# Patient Record
Sex: MALE | Race: WHITE | NOT HISPANIC OR LATINO | URBAN - METROPOLITAN AREA
[De-identification: names, ages, dates, MRNs, and addresses within clinical notes are randomized per-mention and may not be internally consistent; named-entity substitution may affect disease eponyms.]

---

## 2017-09-30 ENCOUNTER — EMERGENCY (EMERGENCY)
Facility: HOSPITAL | Age: 32
LOS: 1 days | Discharge: HOME | End: 2017-09-30
Attending: EMERGENCY MEDICINE | Admitting: EMERGENCY MEDICINE
Payer: COMMERCIAL

## 2017-09-30 VITALS
OXYGEN SATURATION: 96 % | SYSTOLIC BLOOD PRESSURE: 151 MMHG | RESPIRATION RATE: 18 BRPM | DIASTOLIC BLOOD PRESSURE: 81 MMHG | HEART RATE: 82 BPM | TEMPERATURE: 98 F

## 2017-09-30 VITALS — WEIGHT: 190.04 LBS

## 2017-09-30 PROCEDURE — 90715 TDAP VACCINE 7 YRS/> IM: CPT

## 2017-09-30 PROCEDURE — 99284 EMERGENCY DEPT VISIT MOD MDM: CPT

## 2017-09-30 PROCEDURE — 90472 IMMUNIZATION ADMIN EACH ADD: CPT

## 2017-09-30 PROCEDURE — 90375 RABIES IG IM/SC: CPT

## 2017-09-30 PROCEDURE — 90471 IMMUNIZATION ADMIN: CPT

## 2017-09-30 PROCEDURE — 90675 RABIES VACCINE IM: CPT

## 2017-09-30 PROCEDURE — 99283 EMERGENCY DEPT VISIT LOW MDM: CPT | Mod: 25

## 2017-09-30 PROCEDURE — 96372 THER/PROPH/DIAG INJ SC/IM: CPT

## 2017-09-30 RX ORDER — HUMAN RABIES VIRUS IMMUNE GLOBULIN 150 [IU]/ML
1720 INJECTION, SOLUTION INTRAMUSCULAR ONCE
Qty: 0 | Refills: 0 | Status: COMPLETED | OUTPATIENT
Start: 2017-09-30 | End: 2017-09-30

## 2017-09-30 RX ORDER — RABIES VACC, HUMAN DIPLOID/PF 2.5 UNIT
1 VIAL (EA) INTRAMUSCULAR ONCE
Qty: 0 | Refills: 0 | Status: COMPLETED | OUTPATIENT
Start: 2017-09-30 | End: 2017-09-30

## 2017-09-30 RX ORDER — TETANUS TOXOID, REDUCED DIPHTHERIA TOXOID AND ACELLULAR PERTUSSIS VACCINE, ADSORBED 5; 2.5; 8; 8; 2.5 [IU]/.5ML; [IU]/.5ML; UG/.5ML; UG/.5ML; UG/.5ML
0.5 SUSPENSION INTRAMUSCULAR ONCE
Qty: 0 | Refills: 0 | Status: COMPLETED | OUTPATIENT
Start: 2017-09-30 | End: 2017-09-30

## 2017-09-30 RX ADMIN — HUMAN RABIES VIRUS IMMUNE GLOBULIN 1720 UNIT(S): 150 INJECTION, SOLUTION INTRAMUSCULAR at 18:08

## 2017-09-30 RX ADMIN — Medication 1 MILLILITER(S): at 17:49

## 2017-09-30 RX ADMIN — TETANUS TOXOID, REDUCED DIPHTHERIA TOXOID AND ACELLULAR PERTUSSIS VACCINE, ADSORBED 0.5 MILLILITER(S): 5; 2.5; 8; 8; 2.5 SUSPENSION INTRAMUSCULAR at 17:48

## 2017-09-30 NOTE — ED PROVIDER NOTE - OBJECTIVE STATEMENT
Attending MD Bradley: 32M with PMH including asthma presents to the ED with concern over exposure to rabies.  Reports that on 9/27/17 there was a raccoon in his office and it was ushered out during the day.  Reports that same day he was using kitched utensils when his friend reported that the raccoon was licking utensils.  Since then has been concerned about exposure to rabies.  Reports attempted to contact PMD [facility] via email on day of incident and attempted to call on Friday but did not receive response.  Here today for concern of rabies exposure.  Denies fevers, chills, malaise, weakness, decreased appetite, sore throat, nausea, vomiting, headache, photophobia, change in vision. Denies chest pain, shortness of breath, palpitations.    On exam, NAD, head NCAT, PERRL, CN2-12 grossly intact, FROM at neck, no tenderness to palpation or stepoffs along length of spine, lungs CTAB with good inspiratory effort, +S1S2, no m/r/g, abdomen soft with +BS, NT, ND, no CVAT, moving all extremities with 5/5 strength bilateral upper and lower extremities, good and equal  strength bilaterally, sensory grossly intact Attending MD Bradley: 32M with PMH including asthma presents to the ED with concern over exposure to rabies.  Reports that on 9/27/17 there was a raccoon in his office and it was ushered out during the day.  Reports that same day he was using kitched utensils when his friend reported that the raccoon was licking utensils.  Since then has been concerned about exposure to rabies.  Reports attempted to contact PMD [facility] via email on day of incident and attempted to call on Friday but did not receive response.  Here today for concern of rabies exposure.  Denies fevers, chills, malaise, weakness, decreased appetite, sore throat, nausea, vomiting, headache, photophobia, change in vision. Denies chest pain, shortness of breath, palpitations.    On exam, NAD, head NCAT, PERRL, CN2-12 grossly intact, FROM at neck, no tenderness to palpation or stepoffs along length of spine, lungs CTAB with good inspiratory effort, +S1S2, no m/r/g, abdomen soft with +BS, NT, ND, no CVAT, moving all extremities with 5/5 strength bilateral upper and lower extremities, good and equal  strength bilaterally, sensory grossly intact    Mariya CEJA: 32 year-old male w/ no pertinent past medical history presents to the ED for concerns about rabies exposure.  A raccoon was found in the patient's workplace.  He did not have any direct contact with the raccoon - no bites or scratches.  He was eating in the kitchen and did not realize that the raccoon was licking spoons.  He is unsure if he used the same spoon.  Called PCP office (closed due to holidays) and came to ED for evaluation. Attending MD Bradley: 32M with PMH including asthma presents to the ED with concern over exposure to rabies.  Reports that on 9/27/17 there was a raccoon in his office and it was ushered out during the day.  Reports that same day he was using kitchen utensils when his friend reported that the raccoon was licking utensils.  Since then has been concerned about exposure to rabies.  Reports attempted to contact PMD [facility] via email on day of incident and attempted to call on Friday but did not receive response.  Here today for concern of rabies exposure.  Reports raccoon was "kept" by animal control for suspicion of rabies.  Denies fevers, chills, malaise, weakness, decreased appetite, sore throat, nausea, vomiting, headache, photophobia, change in vision. Denies chest pain, shortness of breath, palpitations.    On exam, NAD, head NCAT, PERRL, CN2-12 grossly intact, FROM at neck, no tenderness to palpation or stepoffs along length of spine, lungs CTAB with good inspiratory effort, +S1S2, no m/r/g, abdomen soft with +BS, NT, ND, no CVAT, moving all extremities with 5/5 strength bilateral upper and lower extremities, good and equal  strength bilaterally, sensory grossly intact    Mariya MD: 32 year-old male w/ no pertinent past medical history presents to the ED for concerns about rabies exposure.  A raccoon was found in the patient's workplace.  He did not have any direct contact with the raccoon - no bites or scratches.  He was eating in the kitchen and did not realize that the raccoon was licking spoons.  He is unsure if he used the same spoon.  Called PCP office (closed due to holidays) and came to ED for evaluation.

## 2017-09-30 NOTE — ED PROVIDER NOTE - ATTENDING CONTRIBUTION TO CARE
Attending MD Bradley: I personally have seen and examined this patient.  Resident note reviewed and agree on plan of care and except where noted.  See HPI for details.

## 2017-09-30 NOTE — ED PROVIDER NOTE - MEDICAL DECISION MAKING DETAILS
32M with possible rabies exposure, will give rabies vaccine and post exposure prophylaxis 32M with possible rabies exposure, will give rabies vaccine and post exposure prophylaxis.

## 2017-09-30 NOTE — ED ADULT NURSE NOTE - OBJECTIVE STATEMENT
33 y/o male presents to ED c.o exposure to raccoon saliva on Wednesday. Pt states a raccoon got into his office and was licking utensils. Pt states he used utensils without knowing they were dirty. Pt attempted to contact primary but could not reach him. Pt denies any symptoms currently. Pt safety and comfort provided.

## 2017-09-30 NOTE — ED PROVIDER NOTE - CARE PLAN
Principal Discharge DX:	Rabies exposure Principal Discharge DX:	Rabies exposure  Instructions for follow-up, activity and diet:	Follow-up with your Primary Care Physician within 24-48 hours.  Please return to the Emergency Department immediately for any new, worsening or concerning symptoms; specifically those included in the attached information brochure.     Please come back to the Emergency Room on Tuesday, October 3rd for your next Rabies vaccination.

## 2017-09-30 NOTE — ED PROVIDER NOTE - PLAN OF CARE
Follow-up with your Primary Care Physician within 24-48 hours.  Please return to the Emergency Department immediately for any new, worsening or concerning symptoms; specifically those included in the attached information brochure.     Please come back to the Emergency Room on Tuesday, October 3rd for your next Rabies vaccination.

## 2017-09-30 NOTE — ED PROVIDER NOTE - PHYSICAL EXAMINATION
Attending MD Bradley: See HPI Attending MD Bradley: See HPI    *Gen: NAD, AAO*3, well-appearing, well-nourished  *HEENT: NC/AT, MMM, airway patent, trachea midline  *CV: RRR, S1/S2 present, no murmurs/rubs/gallops  *Resp: no respiratory distress, LCTAB, no wheezing/rales/rhonchi  *Abd: non-distended, soft N/Tx4, no guarding or rigidity  *Neuro: no focal neuro deficits, moving all limbs appropriately  *Extremities: no gross deformity, PMS*4  *Skin: no rashes, no wounds   ~ Gilmar Meraz M.D.

## 2017-09-30 NOTE — ED ADULT TRIAGE NOTE - CCCP TRG CHIEF CMPLNT
exposed to rabid animal/exposure, bloodborne pathogen exposed to rabid animal/see chief complaint quote

## 2017-10-03 ENCOUNTER — EMERGENCY (EMERGENCY)
Facility: HOSPITAL | Age: 32
LOS: 1 days | Discharge: HOME | End: 2017-10-03
Attending: PERSONAL EMERGENCY RESPONSE ATTENDANT | Admitting: PERSONAL EMERGENCY RESPONSE ATTENDANT
Payer: COMMERCIAL

## 2017-10-03 VITALS
DIASTOLIC BLOOD PRESSURE: 89 MMHG | OXYGEN SATURATION: 98 % | HEART RATE: 78 BPM | WEIGHT: 195.11 LBS | TEMPERATURE: 99 F | SYSTOLIC BLOOD PRESSURE: 128 MMHG | RESPIRATION RATE: 16 BRPM

## 2017-10-03 PROCEDURE — 90675 RABIES VACCINE IM: CPT

## 2017-10-03 PROCEDURE — 99283 EMERGENCY DEPT VISIT LOW MDM: CPT | Mod: 25

## 2017-10-03 PROCEDURE — 99283 EMERGENCY DEPT VISIT LOW MDM: CPT

## 2017-10-03 PROCEDURE — 90471 IMMUNIZATION ADMIN: CPT

## 2017-10-03 RX ORDER — RABIES VACC, HUMAN DIPLOID/PF 2.5 UNIT
1 VIAL (EA) INTRAMUSCULAR ONCE
Qty: 0 | Refills: 0 | Status: COMPLETED | OUTPATIENT
Start: 2017-10-03 | End: 2017-10-03

## 2017-10-03 RX ADMIN — Medication 1 MILLILITER(S): at 11:44

## 2017-10-03 NOTE — ED ADULT NURSE NOTE - CHPI ED SYMPTOMS NEG
no decreased eating/drinking/no vomiting/no dizziness/no chills/no tingling/no weakness/no fever/no pain/no numbness/no nausea

## 2017-10-03 NOTE — ED PROVIDER NOTE - MEDICAL DECISION MAKING DETAILS
32M presenting for day 3 repeat dose of rabies vaccine, pt is well appearing, only complaining of mild fogginess since exposure, no const. symptoms, vitals stable, will provide day 3 of vaccine, instruct pt to return to ED for day 7 repeat vaccine administration, plan to d/c with strict return precautions, PMD follow up. 32M presenting for day 3 repeat dose of rabies vaccine, pt is well appearing, only complaining of mild fogginess since exposure, no const. symptoms, able to tolerate PO, vitals stable, will provide day 3 of vaccine, instruct pt to return to ED for day 7 repeat vaccine administration, plan to d/c with strict return precautions, PMD follow up.

## 2017-10-03 NOTE — ED PROVIDER NOTE - PLAN OF CARE
Thank you for visiting our Emergency Department, it has been a pleasure taking part in your healthcare.    Your discharge diagnosis is: rabies, need for vaccination    Please take all discharge medications as indicated below:  Please return to ED on Oct 7 2017 for repeat rabies vaccination.     Please take all medications as indicated. Please follow up with your PMD within x48 hours.    Return precautions to the Emergency Department include: unrelenting nausea, vomiting, fever, chills, chest pain, shortness of breath, dizziness, abdominal pain, syncope, blood in urine or stool, headache that doesn't resolve, numbness or tingling, loss of sensation, loss of motor function, inability to tolerate fluids or food, neck stiffness, photophobia, or any other concerning symptoms.

## 2017-10-03 NOTE — ED PROVIDER NOTE - CARE PLAN
Principal Discharge DX:	Rabies, need for prophylactic vaccination against  Instructions for follow-up, activity and diet:	Thank you for visiting our Emergency Department, it has been a pleasure taking part in your healthcare.    Your discharge diagnosis is: rabies, need for vaccination    Please take all discharge medications as indicated below:  Please return to ED on Oct 7 2017 for repeat rabies vaccination.     Please take all medications as indicated. Please follow up with your PMD within x48 hours.    Return precautions to the Emergency Department include: unrelenting nausea, vomiting, fever, chills, chest pain, shortness of breath, dizziness, abdominal pain, syncope, blood in urine or stool, headache that doesn't resolve, numbness or tingling, loss of sensation, loss of motor function, inability to tolerate fluids or food, neck stiffness, photophobia, or any other concerning symptoms.

## 2017-10-03 NOTE — ED PROVIDER NOTE - ATTENDING CONTRIBUTION TO CARE
Attending MD Rivero.  Pt is a 32 yr old male without past medical hx.  Presents with follow-up for rabies vaccine begun on Saturday.  Today is day 3 of course.  Exposure was Wednesday of last week to raccoon wandering around his kitchen. Denies direct contact with the animal.  Pt due for day 3 of series.  Endorses vague ‘foggy feeling’ since dose on Saturday.  No nausea/vomiting/fevers/chills.  3rd dose of rabies vaccine administered and pt advised to return on day 7 on Saturday 10/7 for next dose.  Stable for discharge.  Follow-up with PCP.  Pt has no other assoc joints today.  No fevers.

## 2017-10-07 ENCOUNTER — EMERGENCY (EMERGENCY)
Facility: HOSPITAL | Age: 32
LOS: 1 days | Discharge: HOME | End: 2017-10-07
Attending: EMERGENCY MEDICINE
Payer: COMMERCIAL

## 2017-10-07 VITALS
DIASTOLIC BLOOD PRESSURE: 77 MMHG | HEART RATE: 75 BPM | TEMPERATURE: 98 F | SYSTOLIC BLOOD PRESSURE: 124 MMHG | RESPIRATION RATE: 16 BRPM | OXYGEN SATURATION: 97 % | WEIGHT: 195.11 LBS

## 2017-10-07 PROCEDURE — 90471 IMMUNIZATION ADMIN: CPT

## 2017-10-07 PROCEDURE — 99283 EMERGENCY DEPT VISIT LOW MDM: CPT | Mod: 25

## 2017-10-07 PROCEDURE — 99283 EMERGENCY DEPT VISIT LOW MDM: CPT

## 2017-10-07 PROCEDURE — 90675 RABIES VACCINE IM: CPT

## 2017-10-07 RX ORDER — RABIES VACC, HUMAN DIPLOID/PF 2.5 UNIT
1 VIAL (EA) INTRAMUSCULAR ONCE
Qty: 0 | Refills: 0 | Status: COMPLETED | OUTPATIENT
Start: 2017-10-07 | End: 2017-10-07

## 2017-10-07 RX ADMIN — Medication 1 MILLILITER(S): at 14:32

## 2017-10-07 NOTE — ED PROVIDER NOTE - ATTENDING CONTRIBUTION TO CARE
32M presents to the ED for his 3rd rabies vaccine. 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 Pt is currently asymptomatic. Had 1st and 2nd does of vaccine and completed IG. Now here for 3rd dose. will return for 4th in 1 week. pt has good follow up. will give vaccine and d.c with return precautions.     Constitutional: No fever or chills  HEENT: No URI symptoms  CV: No chest pain  Resp: No SOB no cough  GI: No abd pain, nausea or vomiting  MSK: No musculoskeletal pain  Neuro: No headache     Constitutional: NAD AAOx3  Eyes: PERRLA EOMI  Head: Normocephalic atraumatic  Cardiac: regular rate   Resp: Lungs CTAB  Vaibhav Thurston M.D., Attending Physician

## 2017-10-07 NOTE — ED PROVIDER NOTE - MEDICAL DECISION MAKING DETAILS
32M presents to the ED for his 3rd rabies vaccine. 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 Pt is currently asymptomatic. Had 1st and 2nd does of vaccine and completed IG. Now here for 3rd dose. will return for 4th in 1 week. pt has good follow up. will give vaccine and d.c with return precautions. Vaibhav Thurston M.D., Attending Physician

## 2017-10-07 NOTE — ED PROVIDER NOTE - OBJECTIVE STATEMENT
33yo male pt, ambulatory c/o 3rd rabies vaccination after exposure to raccoon one week ago at work, he was using utensils after a raccoon licked them. Denies bite or scratching. Denies headache, dizziness or fever/ chills. Pt stated he had fatigue after first rabies vaccination, but the symptoms improved now. Denies CP/SOB/ABD pain or N/V/D. Denies sensory changes or weakness to extremities.

## 2017-10-14 ENCOUNTER — EMERGENCY (EMERGENCY)
Facility: HOSPITAL | Age: 32
LOS: 1 days | Discharge: HOME | End: 2017-10-14
Attending: EMERGENCY MEDICINE | Admitting: EMERGENCY MEDICINE
Payer: COMMERCIAL

## 2017-10-14 VITALS
TEMPERATURE: 98 F | SYSTOLIC BLOOD PRESSURE: 117 MMHG | RESPIRATION RATE: 18 BRPM | HEART RATE: 64 BPM | OXYGEN SATURATION: 96 % | DIASTOLIC BLOOD PRESSURE: 73 MMHG

## 2017-10-14 PROCEDURE — 99283 EMERGENCY DEPT VISIT LOW MDM: CPT | Mod: 25

## 2017-10-14 PROCEDURE — 90675 RABIES VACCINE IM: CPT

## 2017-10-14 PROCEDURE — 99283 EMERGENCY DEPT VISIT LOW MDM: CPT

## 2017-10-14 PROCEDURE — 90471 IMMUNIZATION ADMIN: CPT

## 2017-10-14 RX ORDER — RABIES VACC, HUMAN DIPLOID/PF 2.5 UNIT
1 VIAL (EA) INTRAMUSCULAR ONCE
Qty: 0 | Refills: 0 | Status: COMPLETED | OUTPATIENT
Start: 2017-10-14 | End: 2017-10-14

## 2017-10-14 RX ADMIN — Medication 1 MILLILITER(S): at 14:41

## 2017-10-14 NOTE — ED ADULT NURSE NOTE - OBJECTIVE STATEMENT
31 y/o male arrived to ED for rabies follow up. Pt states that a raccoon was in work office and in kitchen, unknown what raccoon licked and then staff ate so required to come in. day 14 of shot. no symptoms.

## 2017-10-14 NOTE — ED PROVIDER NOTE - ATTENDING CONTRIBUTION TO CARE
patient reporting for rabies vaccination 4th dosing 14 days. patient was exposed to raccoon licking utensils at work that was on video after patient ate with said utensils and notified by his employer for evaluation at emergency department and rabies vacc was initiated.  ncat, cooperative, no rash, alert, with capacity and insight, non-tachycardic, non-tachypneic, no edema, non-distended  will get vacc as scheduled, patient without reaction in prior vacc.   No immediate life threatening issues present on history or clinical exam. Patient is a safe disposition home, has capacity and insight into their condition, ambulatory in the emergency department and will follow up with their doctor(s) this week. Patient  understands anticipatory guidance and was given strict return and follow up precautions. The patient has been informed of all concerning signs and symptoms to return to Emergency Department, the necessity to follow up with the PMD/Clinic/follow up provided within 2-3 days was explained, and the patient reports understanding of above with capacity and insight.

## 2017-10-14 NOTE — ED PROVIDER NOTE - OBJECTIVE STATEMENT
33yo male pt, ambulatory c/o 4th rabies vaccination after exposure to raccoon two week ago at work, he was using utensils after a raccoon licked them. Denies bite or scratching. Denies fever or chills. Denies headache or dizziness. Denies sensory changes or weakness. Denies CP/SOB/ABD pain or N/V/D.

## 2019-09-05 NOTE — ED PROVIDER NOTE - CPE EDP SKIN NORM
normal... Dapsone Pregnancy And Lactation Text: This medication is Pregnancy Category C and is not considered safe during pregnancy or breast feeding.

## 2022-04-12 NOTE — ED PROVIDER NOTE - OBJECTIVE STATEMENT
(2) cough or sneeze Pt is a 32M with no pmh presenting with a cc of rabies vaccine follow up. Per pt, presented to [facility] x3 days ago s/p exposure to raccoon in workplace. Notes raccoon was found in kitchen, roaming around food contents, pt denies direct contact with animal, no scratches or bites. Since initial vaccination on Sat, only c/o mild foggyness, otherwise no complains. Reports tolerating PO without complication. Denies n/v/f/c/cp/sob. Denies headache, syncope, lightheadedness, dizziness. Denies chest palpitations, abdominal pain. Denies dysuria, hematuria, hematochezia, BRBPR, tarry stools, diarrhea, constipation. Pt is a 32M with no pmh presenting with a cc of rabies vaccine follow up. Per pt, presented to [facility] x3 days ago s/p exposure to raccoon in workplace. Notes raccoon was found in kitchen, roaming around food contents, pt denies direct contact with animal, no scratches or bites. Since initial vaccination on Sat, only c/o mild fogginess, otherwise no complains. Reports tolerating PO without complication. Denies n/v/f/c/cp/sob. Denies headache, syncope, lightheadedness, dizziness. Denies chest palpitations, abdominal pain. Denies dysuria, hematuria, hematochezia, BRBPR, tarry stools, diarrhea, constipation.

## 2025-05-14 NOTE — ED ADULT NURSE NOTE - CHIEF COMPLAINT
Your current Orthopaedic problem we are working together to treat is:  Left hip    It is recommended you schedule a follow-up appointment with Benjy Eng MD in 1 year from surgery (3/2026).  Office hours are 8:00 am to 5:00 pm Monday through Friday.  If it is urgent that you speak with someone outside of these hours, our [facility] Call Center will be able to assist you.  You can reach the office by calling the [phone] ([place]).  We do highly recommend [facility], if you do not already have this.  You can request access via the internet or by simply talking with a  at any of the clinics.  [email or web address].    Thank you for choosing [facility] as your Orthopaedic provider!   
The patient is a 32y Male complaining of rabies follow up.